# Patient Record
(demographics unavailable — no encounter records)

---

## 2025-01-09 NOTE — ASSESSMENT
[FreeTextEntry1] :                                                       Assessment/Plan:   MARIBEL LÓPEZ is a 28 year male with cervical spine pain subsequent to weight lifting here for initial consultation. - Tiers of treatment and management of above diagnosis(es) were discussed with patient - Optimal diet, weight, sleep, and lifestyle management to minimize stress and maximize well being counseling provided - Imaging reviewed and discussed with patient - Patient was advised to start a structured, targeted therapy program 2-3x/wk for 8 wks with goal toward HEP - Patient was educated on an appropriate home exercise program, provided with exercise recommendations, all questions answered - Jan 09, 2025:  Patient was prescribed medrol dose pack (x1) with written instructions, all questions answered, informed of side effects of the medication.  Possible side effects, including hyperglycemia, GI upset, and GI bleed, reviewed with patient. In agreement with patient that potential pain reduction and anti-inflammatory benefits currently outweigh known side effect profile for oral corticosteroids. Patient instructed to immediately stop medication should she develop any abdominal pain, nausea, vomiting, bloody stools, or BRBPR - Patient may trial acetaminophen 1000mg up to TID PRN moderate to severe pain and to decrease average consumption of NSAIDs - Patient was prescribed methocarbamol 750mg 1-2 tabs up to TID PRN muscle spasm, informed of sedative potential, advised to avoid driving, taking the subway, or operating heavy machinery, patient displayed a clear understanding of the plan including medication, its purpose and side effects, all questions answered - Patient was advised to apply cool compresses or warm heat to affected regions PRN - Radiographs of cervical spine ordered 01/09/2025, reviewed including Jan 09, 2025     - Educated about red flag symptoms including (but not limited to) new, worsened, or persistent: fever greater than 100F, bowel or bladder incontinence, bowel obstipation, inability to void urine, urinary leakage, Severe nausea or vomiting, Worsening numbness, worsening tingling/paresthesias, and/or new or progressive motor weakness; advised to seek immediate medical attention at his nearest Emergency department should they experience any of the above   - Patient relates having minimal interest in locally directed treatment of their condition at this time, they were counseled on the role for local treatment as part of the tiers of treatment for their condition, all questions answered    - 01/09/2025 stat MRI cervical spine without contrast is indicated given that the pt has not improved with tylenol, ibuprofen, naproxen, meloxicam, they underwent non-diagnostic radiographic imaging of the region Jan 09, 2025 , weight lifting injury (squatting/deadlifting) with axial > radicular pain ight C4 uncinate process abnormality, C5/6 disc height loss with dynamic flexion > extension instability, r/o bony pathology, etiology of instability and physical therapy/home exercise program>6 weeks. Patient's imaging is medically necessary to outline targets for locally (interventional) directed treatments and/or guide surgical management.   - Follow up in 2-3 weeks after imaging via telemedicine   I have personally spent a total of at least 45 minutes preparing, reviewing internal and external records, explaining, counseling, providing necessary information via documented paperwork for this encounter, and coordinating care for this patient encounter.   Thank you, (s), for allowing me to participate in the care of your patient. Please do not hesitate to contact me with questions/concerns.   Kyle Evangelista M.D. Sports and Spine Department of Physical Medicine and Rehabilitation [facility] Physician [facility] Orthopaedic [facility] [address], [address] [place] [address]   Appointments: [phone] Fax: [phone]

## 2025-01-09 NOTE — HISTORY OF PRESENT ILLNESS
[FreeTextEntry1] : Kyle Evangelista M.D. Sports and Spine Medicine Department of Physical Medicine and Rehabilitation [facility] Orthopaedic [facility] [address] [facility], [address]th [place] [address]   [facility] Orthopa[facility] [place] at [facility] [address] [place] [address], 4th [place] [address]    For [place] Appointments Phone: [phone] Fax: [phone]   ----------------------------------------------------------------------------------------------------------------------------------------    PATIENT: MARIBEL LÓPEZ MRN: [id number] YOB: 1996 DATE OF SERVICE: 01/09/2025 Jan 09, 2025    Dear Drs.    Thank you for referring MARIBEL LÓPEZ to my Sports and Spine practice and office. Enclosed is a copy of the patient's consultation/progress note, which includes my complete assessment and recent studies completed during the patient's evaluation. If you have questions or have any patients who require nonsurgical, non-opiate management of any sports, spine, or musculoskeletal conditions, please do not hesitate to contact my  at [phone]. I look forward to taking care of your patients along with you.   Sincerely,   Kyle Evangelista MD Sports, Spine, & Regenerative Musculoskeletal Medicine Orthopaedic [facility]                                                      Initial Consultation:   CC: neck pain   HPI:  This is the first visit to Orthopaedic [place] at [facility] Medicine [facility] Spine Practice.     MARIBEL LÓPEZ presents with the chief complaint as above.   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[employer], never numbness, radicular BUE pain before nor presently The patients difficulties began 1/1/25 -1/4/25 The pain is graded as 6-7/10 The pain is described as axial, focal, dull pressure  The pain is intermittently worse with cervical lateral rotation The pain does not radiate points to the B/L upper trapezius The patient feels that the pain is overall persistent Patient denies other recent fall, MVA, injury, trauma, or accident besides presenting history above   Aggravating: prolonged sitting, prolonged standing, navigating stairs, getting out of bed, sit to stand transitional movements Alleviating: rest, activity modification, pharmacologic treatments as per intake and as above   Meds: denies regular PO pain medications (acetaminophen 500mg at 3pom since injury; ibuprofen 600mg at bedtime - x 4 day consecutive) Therapy Program: no recent structured targeted therapy program HEP: doing HEP regularly; HEP consists of greater than four weeks, 5-6 days per week, 10-15 minutes per session Injection Hx: denies locally directed treatment to the area in question Imaging Hx: reviewed cervical XR and video of recent lifting patterns    Assoc Sx: denies numbness, Tingling Denies Focal motor weakness in the upper or lower limbs Denies New or worsened bowel or bladder incontinence Denies Saddle anesthesia Denies Using Orthotic(s)/Supportive devices Denies Swelling in the upper/lower extremities They also deny frequent tripping, falling   ROS: A 14 point review of systems was completed. Positive findings are pain as described above. The remaining systems negative. COVID HX: reviewed    Assoc Hx: Ambulates without assistive device Level of functioning: indep with ambulation, indep with ADLs Living Situation: dwelling with steps to ente

## 2025-01-09 NOTE — DATA REVIEWED
[FreeTextEntry1] : 01/09/2025  Cervical Spine XR [6 views, AP, Lateral, Left/right Obliques, Flex, Ext]:   Indication: Pain   Technique: 6 view/s of the Cervical spine.   Findings: The Cervical vertebrae are visualized from C1-C7. on ap, right C4 uncinate process radiolucency otherwise no acute fracture is identified. The vertebral body and disc space heights are generally maintained except for height loss at C5/6 also C4/5. Multilevel facet degeneration noted at C3/4, C4/5 which also demonstrate prominent anterior osteophyte formation. The vertebral bodies maintain normal alignment without spondylolisthesis including during Flexion-Extension views except for anterolisthesis C5 on C6 with flexion > extension.  The Odontoid process is intact. There is no pre-vertebral soft tissue swelling. There is loss of cervical lordosis.   Impression: MIld cervical DDD, dynamic anterolisthesis of C5 on C6 grade 1

## 2025-01-09 NOTE — PHYSICAL EXAM
[FreeTextEntry1] : Gen: A+O x 3 in NAD Psych: Normal mood and affect. Responds appropriately to commands Eyes: Anicteric. No discharge. EOMI. Resp: Breathing unlabored CV: radial pulses 2+ and equal. No varicosities noted Ext: No c/c/e Skin: No lesions noted   Gait: Non antalgic +  reciprocating heel to toe able to stand on toes and heels WITH hand holding/holding onto counter with both hands Tandem gait intact WITH hand holding Romberg negative   CN2-12 grossly intact   Inspection: Spine alignment is midline, with no evidence of scoliosis. Iliac crest heights and PSIS heights level.   + Forward head position.   Palpation: There is + tenderness over the upper traps, levator scaps, rhomboids, articular pillars, cervical paraspinals, B/L There is + tenderness middle traps, latissimus dorsi, serratus anterior, B/L   Cervical ROM: Flexion, extension, side-bending, rotation, limited in most planes with pain at terminal ROM Finger to nose bilaterally intact    C5 (Shoulder Abduction)        C5 (Elbow Flex)         C6 (Wrist Ext) Right 5/5                                 5/5                           5/5      Left 5/5                                 5/5                           5/5            C7 (Elbow Ext)            C7 (Wrist Flex)             C8 (Long Finger Flex)          T1 (Finger Abduct)         Right 5/5                     5/5                                      5/5                                       5/5                                                Left 5/5                     5/5                                      5/5                                       5/5                                 C8 (Thumb Ext)            C8 & T1 (Thumb Opp)          Right 5/5                            5/5                                                Left 5/5                            5/5                               Tone: Normal. No cog wheeling. Proprioception at DIPs intact B/L Sensation: Grossly intact to light touch and pinprick bilateral upper extremities. Reflexes: 2+ symmetric biceps, pronators, brachioradialis, triceps Hoffmans Sign negative Spurling's Sign negative Shoulder Abduction Test (Bakody's) absent Lhermittes Sign negative

## 2025-02-05 NOTE — ASSESSMENT
[FreeTextEntry1] :                                                       Assessment/Plan:   MARIBEL LÓPEZ is a 28 year male with cervical spine pain subsequent to weight lifting here for initial consultation. - Tiers of treatment and management of above diagnosis(es) were discussed with patient - Optimal diet, weight, sleep, and lifestyle management to minimize stress and maximize well being counseling provided - Imaging reviewed and discussed with patient - Patient was advised to start a structured, targeted therapy program 2-3x/wk for 8 wks with goal toward HEP - Patient was educated on an appropriate home exercise program, provided with exercise recommendations, all questions answered - Jan 09, 2025:  Patient was prescribed medrol dose pack (x1) with written instructions, all questions answered, informed of side effects of the medication.  Possible side effects, including hyperglycemia, GI upset, and GI bleed, reviewed with patient. In agreement with patient that potential pain reduction and anti-inflammatory benefits currently outweigh known side effect profile for oral corticosteroids. Patient instructed to immediately stop medication should she develop any abdominal pain, nausea, vomiting, bloody stools, or BRBPR - Patient may trial acetaminophen 1000mg up to TID PRN moderate to severe pain and to decrease average consumption of NSAIDs - Patient was prescribed methocarbamol 750mg 1-2 tabs up to TID PRN muscle spasm, informed of sedative potential, advised to avoid driving, taking the subway, or operating heavy machinery, patient displayed a clear understanding of the plan including medication, its purpose and side effects, all questions answered - Patient was advised to apply cool compresses or warm heat to affected regions PRN - Radiographs of cervical spine ordered 01/09/2025, reviewed including Jan 09, 2025     - Educated about red flag symptoms including (but not limited to) new, worsened, or persistent: fever greater than 100F, bowel or bladder incontinence, bowel obstipation, inability to void urine, urinary leakage, Severe nausea or vomiting, Worsening numbness, worsening tingling/paresthesias, and/or new or progressive motor weakness; advised to seek immediate medical attention at his nearest Emergency department should they experience any of the above   - Patient relates having minimal interest in locally directed treatment of their condition at this time, they were counseled on the role for local treatment as part of the tiers of treatment for their condition, all questions answered    - 01/09/2025 stat MRI cervical spine without contrast is indicated given that the pt has not improved with tylenol, ibuprofen, naproxen, meloxicam, they underwent non-diagnostic radiographic imaging of the region Jan 09, 2025 , weight lifting injury (squatting/deadlifting) with axial > radicular pain ight C4 uncinate process abnormality, C5/6 disc height loss with dynamic flexion > extension instability, r/o bony pathology, etiology of instability and physical therapy/home exercise program>6 weeks. Patient's imaging is medically necessary to outline targets for locally (interventional) directed treatments and/or guide surgical management.   - Follow up in 2-3 weeks after imaging via telemedicine   I have personally spent a total of at least 45 minutes preparing, reviewing internal and external records, explaining, counseling, providing necessary information via documented paperwork for this encounter, and coordinating care for this patient encounter.   Thank you, (s), for allowing me to participate in the care of your patient. Please do not hesitate to contact me with questions/concerns.   Kyle Evangelista M.D. Sports and Spine Department of Physical Medicine and Rehabilitation [facility] Physician [facility] Orthopaedic [facility] [address], [address] [place] [address]   Appointments: [phone] Fax: [phone]

## 2025-02-05 NOTE — HISTORY OF PRESENT ILLNESS
[FreeTextEntry1] : Kyle Evangelista M.D. Sports and Spine Medicine Department of Physical Medicine and Rehabilitation [facility] Orthopaedic [facility] [address], [address] [place] [address]   [facility] [place] at [facility] [address], [address] [place] [address]    For [place] Appointments Phone: [phone] Fax: [phone]   ----------------------------------------------------------------------------------------------------------------------------------------    PATIENT: MARIBEL LÓPEZ MRN: [id number] YOB: 1996 DATE OF SERVICE: Feb 06, 2025 Feb 06, 2025     Dear Drs.    Thank you for referring MARIBEL LÓPEZ to my Sports and Spine practice and office. Enclosed is a copy of the patient's consultation/progress note, which includes my complete assessment and recent studies completed during the patient's evaluation. If you have questions or have any patients who require nonsurgical, non-opiate management of any sports, spine, or musculoskeletal conditions, please do not hesitate to contact my  at [phone]. I look forward to taking care of your patients along with you.   Sincerely,   Kyle Evangelista MD Sports, Spine, & Regenerative Musculoskeletal Medicine Orthopaedic [facility]                                                      Follow Up    CC: neck pain   HPI:  This is a follow up visit to Orthopaedic [place] at [facility] Sports Medicine and Spine Practice.     MARIBEL LÓPEZ presents with the chief complaint as above.  Interval Hx on Feb 06, 2025: presents for follow up. At the last visit, we advised starting PT program, advised medrol dose pack, advised muscle relaxant as needed, recommended cervical MRI, advised reviewing information for trigger point injections, and suggested follow up in 2-4 weeks via TEB to review imaging, response to medications. For the cervical MRI our key clinical question was: weight lifting injury (squatting/deadlifting) with axial > radicular painright C4 cinate process abnormality, C5/6 disc height loss with dynamic flexion > extension instability, r/o bony pathology, etiology of instability. Since the last visit, patient had Cervical MRI, Patient informed of all relevant imaging findings, all questions were answered including reversal of lordosis at C4, no bone marrow uncinate region abnormality, C5/6 disc bulge, B/L mild foraminal stenosis.  Since the last visit, they relate significant improvements in pain previously associated with known exacerbating, aggravating factors and situations. Pharmacologic treatments now include OTC analgesics PRN, but otherwise pharmacologic treatments are minimal. Given dearth of symptoms, pharmacologic treatments are now minimal. Denies new or worsened numbness, tingling, or focal motor deficit. Denies interval fall, accident, or injury. Denies change in bowel or bladder functioning.   Initial Hx on 01/09/2025; Presents in person to [facility]. patient has been doing usual resistance training routine, increased weight while doing 8-10 rep deadlifts (straight bar 225-275) as well as squatting. 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 The patients difficulties began 1/1/25 -1/4/25. The pain is graded as 6-7/10. The pain is described as axial, focal, dull pressure . The pain is intermittently worse with cervical lateral rotation. The pain does not radiate. points to the B/L upper trapezius. The patient feels that the pain is overall persistent Patient denies other recent fall, MVA, injury, trauma, or accident besides presenting history above. Aggravating: prolonged sitting, prolonged standing, navigating stairs, getting out of bed, sit to stand transitional movements. Alleviating: rest, activity modification, pharmacologic treatments as per intake and as above   Meds: denies regular PO pain medications (acetaminophen 500mg at 3pm since injury; ibuprofen 600mg at bedtime - x 4 day consecutive) Therapy Program: no recent structured targeted therapy program HEP: doing HEP regularly; HEP consists of greater than four weeks, 5-6 days per week, 10-15 minutes per session Injection Hx: denies locally directed treatment to the area in question Imaging Hx: reviewed cervical XR and video of recent lifting patterns    Assoc Sx: denies numbness, Tingling Denies Focal motor weakness in the upper or lower limbs Denies New or worsened bowel or bladder incontinence Denies Saddle anesthesia Denies Using Orthotic(s)/Supportive devices Denies Swelling in the upper/lower extremities They also deny frequent tripping, falling   ROS: A 14 point review of systems was completed. Positive findings are pain as described above. The remaining systems negative. COVID HX: reviewed    Assoc Hx: Ambulates without assistive device Level of functioning: indep with ambulation, indep with ADLs Living Situation: dwelling with steps to ente

## 2025-02-05 NOTE — REASON FOR VISIT
[Follow-Up] : a follow-up visit [Home] : at home, [unfilled] , at the time of the visit. [Medical Office: ([facility])___] : at the medical office located in  [Patient] : the patient